# Patient Record
Sex: FEMALE
[De-identification: names, ages, dates, MRNs, and addresses within clinical notes are randomized per-mention and may not be internally consistent; named-entity substitution may affect disease eponyms.]

---

## 2021-11-17 PROBLEM — Z00.00 ENCOUNTER FOR PREVENTIVE HEALTH EXAMINATION: Status: ACTIVE | Noted: 2021-11-17

## 2021-11-23 ENCOUNTER — APPOINTMENT (OUTPATIENT)
Dept: VASCULAR SURGERY | Facility: CLINIC | Age: 66
End: 2021-11-23
Payer: MEDICARE

## 2021-11-23 VITALS
WEIGHT: 145 LBS | DIASTOLIC BLOOD PRESSURE: 78 MMHG | HEART RATE: 69 BPM | SYSTOLIC BLOOD PRESSURE: 122 MMHG | TEMPERATURE: 98 F | HEIGHT: 62 IN | BODY MASS INDEX: 26.68 KG/M2

## 2021-11-23 DIAGNOSIS — Z78.9 OTHER SPECIFIED HEALTH STATUS: ICD-10-CM

## 2021-11-23 DIAGNOSIS — M79.605 PAIN IN RIGHT LEG: ICD-10-CM

## 2021-11-23 DIAGNOSIS — M79.604 PAIN IN RIGHT LEG: ICD-10-CM

## 2021-11-23 DIAGNOSIS — Z86.79 PERSONAL HISTORY OF OTHER DISEASES OF THE CIRCULATORY SYSTEM: ICD-10-CM

## 2021-11-23 DIAGNOSIS — Z86.19 PERSONAL HISTORY OF OTHER INFECTIOUS AND PARASITIC DISEASES: ICD-10-CM

## 2021-11-23 PROCEDURE — 93970 EXTREMITY STUDY: CPT

## 2021-11-23 PROCEDURE — 99203 OFFICE O/P NEW LOW 30 MIN: CPT

## 2021-11-23 RX ORDER — RALOXIFENE HYDROCHLORIDE 60 MG/1
60 TABLET ORAL
Refills: 0 | Status: ACTIVE | COMMUNITY

## 2021-11-23 RX ORDER — LORATADINE 10 MG/1
10 CAPSULE, LIQUID FILLED ORAL
Refills: 0 | Status: ACTIVE | COMMUNITY

## 2021-11-23 NOTE — HISTORY OF PRESENT ILLNESS
[FreeTextEntry1] : SUSHIL LYON is a 66 year old female who presents for evaluation of lower extremity pain. \par \par Patient states that since four years ago, when she underwent a back injury, she has been having lower extremity pain. She underwent acupuncture and also underwent injection therapy in [place] with some relief in her back pain but her leg pain persists. She reports pain with long distance walking. \par \par + PMH: HTN, hepatitis\par + PSH: s/p uterine surgery\par + FH: cancer\par + SH: never smoker, no EtOH use\par + Aller: iodine\par \par PCP is Dr. Newton Rodriguez. \par

## 2021-11-23 NOTE — DATA REVIEWED
[FreeTextEntry1] : 11/23/2021-BLE venous duplex\par Negative for DVT or SVT.\par Negative for reflux bilaterally.\par \par She brings with her today copy of MRI Lumbar Spine performed in 9/22/2017, which is significant for multilevel degenerative disc disease.

## 2021-11-23 NOTE — CONSULT LETTER
[Dear  ___] : Dear  [unfilled], [Consult Letter:] : I had the pleasure of evaluating your patient, [unfilled]. [Please see my note below.] : Please see my note below. [Consult Closing:] : Thank you very much for allowing me to participate in the care of this patient.  If you have any questions, please do not hesitate to contact me. [Sincerely,] : Sincerely, [FreeTextEntry1] : She has a history of back injury and subsequent back and leg pain several years ago. She underwent evaluation and treatment in [place] but has persistent back and leg pain. The pain is worsened with sitting for prolonged periods of time. On exam, she has palpable bilateral lower extremity pulses without evidence of arterial insufficiency. I performed lower extremity venous duplex. This was negative for deep venous thrombosis, superficial venous thrombophlebitis, and venous insufficiency. I reviewed her MRI results from 2017, which was significant for multilevel degenerative disc disease. I believe that the etiology of her pain is musculoskeletal or neurologic in nature and advise follow up with spine surgery. I am including a copy of her report for your records.  [FreeTextEntry3] : \par \par \par \par Nikki Burns MD, RPVI\par Division of Vascular & Endovascular Surgery\par [facility], Department of Surgery

## 2021-11-23 NOTE — PHYSICAL EXAM
[2+] : left 2+ [Ankle Swelling (On Exam)] : present [Ankle Swelling Bilaterally] : bilaterally  [Ankle Swelling On The Right] : mild [Calm] : calm [Varicose Veins Of Lower Extremities] : not present [] : not present [de-identified] : Well-appearing  [de-identified] : EOMI, anicteric  [de-identified] : soft, nt, nd  [de-identified] : motor and sensation intact in all four extremities  [de-identified] : A&Ox4

## 2021-11-23 NOTE — ASSESSMENT
[FreeTextEntry1] : SUSHIL LYON is a 66 year old female presents for evaluation of lower extremity pain.\par \par - On exam, patient has palpable lower extremity pulses throughout without evidence of arterial insufficiency.\par - Reviewed results of venous duplex w patient. There is no DVT or SVT or reflux bilaterally.\par - Given patient's history of low back injury and pain description, suspect etiology of patient's pain is likely due to degenerative disc disease. There appears to be no evidence of arterial or venous insufficiency to account for her lower extremity pain. Recommended that patient follow up with her primary care physician or spine surgeon for further evaluation.